# Patient Record
Sex: MALE | Race: WHITE | NOT HISPANIC OR LATINO | URBAN - METROPOLITAN AREA
[De-identification: names, ages, dates, MRNs, and addresses within clinical notes are randomized per-mention and may not be internally consistent; named-entity substitution may affect disease eponyms.]

---

## 2017-06-26 PROBLEM — Z00.00 ENCOUNTER FOR PREVENTIVE HEALTH EXAMINATION: Status: ACTIVE | Noted: 2017-06-26

## 2017-06-28 ENCOUNTER — EMERGENCY (EMERGENCY)
Facility: HOSPITAL | Age: 72
LOS: 0 days | Discharge: HOME | End: 2017-06-28
Attending: EMERGENCY MEDICINE | Admitting: EMERGENCY MEDICINE
Payer: COMMERCIAL

## 2017-06-28 VITALS
TEMPERATURE: 98 F | HEIGHT: 77 IN | RESPIRATION RATE: 18 BRPM | SYSTOLIC BLOOD PRESSURE: 127 MMHG | WEIGHT: 244.93 LBS | DIASTOLIC BLOOD PRESSURE: 63 MMHG | OXYGEN SATURATION: 100 % | HEART RATE: 68 BPM

## 2017-06-28 DIAGNOSIS — Z98.1 ARTHRODESIS STATUS: Chronic | ICD-10-CM

## 2017-06-28 PROCEDURE — 99283 EMERGENCY DEPT VISIT LOW MDM: CPT

## 2017-06-28 RX ORDER — LIDOCAINE 4 G/100G
1 CREAM TOPICAL
Qty: 14 | Refills: 0 | OUTPATIENT
Start: 2017-06-28

## 2017-06-28 RX ORDER — LIDOCAINE 4 G/100G
1 CREAM TOPICAL ONCE
Qty: 0 | Refills: 0 | Status: COMPLETED | OUTPATIENT
Start: 2017-06-28 | End: 2017-06-28

## 2017-06-28 RX ORDER — METHOCARBAMOL 500 MG/1
750 TABLET, FILM COATED ORAL ONCE
Qty: 0 | Refills: 0 | Status: COMPLETED | OUTPATIENT
Start: 2017-06-28 | End: 2017-06-28

## 2017-06-28 RX ORDER — KETOROLAC TROMETHAMINE 30 MG/ML
60 SYRINGE (ML) INJECTION ONCE
Qty: 0 | Refills: 0 | Status: DISCONTINUED | OUTPATIENT
Start: 2017-06-28 | End: 2017-06-28

## 2017-06-28 RX ORDER — METHOCARBAMOL 500 MG/1
1 TABLET, FILM COATED ORAL
Qty: 12 | Refills: 0 | OUTPATIENT
Start: 2017-06-28

## 2017-06-28 RX ORDER — METHOCARBAMOL 500 MG/1
1500 TABLET, FILM COATED ORAL ONCE
Qty: 0 | Refills: 0 | Status: DISCONTINUED | OUTPATIENT
Start: 2017-06-28 | End: 2017-06-28

## 2017-06-28 RX ADMIN — METHOCARBAMOL 750 MILLIGRAM(S): 500 TABLET, FILM COATED ORAL at 11:15

## 2017-06-28 RX ADMIN — Medication 60 MILLIGRAM(S): at 11:05

## 2017-06-28 RX ADMIN — LIDOCAINE 1 PATCH: 4 CREAM TOPICAL at 11:05

## 2017-06-28 NOTE — ED STATDOCS - OBJECTIVE STATEMENT
73 yo male h/o sciatica, herniated disc, and back surgeries x3, presents for worsening back pain x 3 weeks. Pt saw his PCP at [place] who prescribed him muscle relaxants for two weeks but he is still in pain and has been unable to walk much the past 3 weeks. Pain is left sided and goes down his left leg. Has been taking tylenol for pain relief.

## 2017-06-28 NOTE — ED ADULT TRIAGE NOTE - ESI TRIAGE ACUITY LEVEL, MLM
O/N: Hypotensive to 80s, mild tachycardia, asymptomatic. UOP adeqaute. High volume output from fistulae. Vac changed, dressings re-packed.  3/4: Dressing changed. vac functioning. Afebrile this shift. 3 O/N: Hypotensive to 80s, mild tachycardia, asymptomatic. UOP adeqaute. High volume output from fistulae. Vac changed, dressings re-packed.  3/4: Dressing changed. vac functioning. Afebrile this shift.       SUBJECTIVE:  Patient seen and examined at bedside with surgery chief resident on AM rounds. Pt w/ no complaints at this time. Tolerating diet. Denies n/v/pain. No other complaints.     MEDICATIONS  (STANDING):  losartan 100milliGRAM(s) Oral daily  escitalopram 20milliGRAM(s) Oral daily  ALBUTerol    90 MICROgram(s) HFA Inhaler 1Puff(s) Inhalation every 4 hours  cyanocobalamin 250MICROGram(s) Oral daily  cholecalciferol 400Unit(s) Oral daily  multivitamin 1Tablet(s) Oral daily  ferrous    sulfate 325milliGRAM(s) Oral daily  enoxaparin Injectable 60milliGRAM(s) SubCutaneous daily  pantoprazole    Tablet 40milliGRAM(s) Oral daily  collagenase Ointment 1Application(s) Topical daily  bisacodyl 5milliGRAM(s) Oral at bedtime  levoFLOXacin IVPB 750milliGRAM(s) IV Intermittent every 24 hours  levoFLOXacin IVPB  IV Intermittent     MEDICATIONS  (PRN):  acetaminophen   Tablet. 325milliGRAM(s) Oral every 4 hours PRN Moderate Pain (4 - 6)  ondansetron Injectable 4milliGRAM(s) IV Push every 6 hours PRN Nausea  ALBUTerol    0.083% 2.5milliGRAM(s) Nebulizer every 6 hours PRN Shortness of Breath and/or Wheezing  oxybutynin 5milliGRAM(s) Oral two times a day PRN Bladder spasms  diphenhydrAMINE   Capsule 25milliGRAM(s) Oral every 6 hours PRN Rash and/or Itching  diazepam    Tablet 10milliGRAM(s) Oral at bedtime PRN insomnia  oxyCODONE  5 mG/acetaminophen 325 mG 1Tablet(s) Oral every 4 hours PRN Severe Pain (7 - 10)      Vital Signs Last 24 Hrs  T(C): 37.1, Max: 37.2 (03-04 @ 20:42)  T(F): 98.8, Max: 99 (03-04 @ 20:42)  HR: 86 (76 - 95)  BP: 100/69 (83/49 - 109/69)  BP(mean): --  RR: 17 (15 - 18)  SpO2: 91% (91% - 97%)    PHYSICAL EXAM:      Constitutional: A&Ox3    Respiratory: non labored breathing, no respiratory distress    Cardiovascular: NSR, RRR    Gastrointestinal: Soft, ND, NT, Abdominal wounds w/ wound vac and dressings in place                 Incision: C/D/I, wound vac functioning appropriately    Genitourinary: voids     Extremities: (-) edema                  I&O's Detail    I & Os for current day (as of 05 Mar 2017 07:30)  =============================================  IN:    Oral Fluid: 600 ml    Total IN: 600 ml  ---------------------------------------------  OUT:    Voided: 1300 ml    Total OUT: 1300 ml  ---------------------------------------------  Total NET: -700 ml      LABS:                        10.1   6.5   )-----------( 273      ( 04 Mar 2017 07:21 )             33.4     04 Mar 2017 07:21    139    |  102    |  24     ----------------------------<  80     4.5     |  27     |  0.76     Ca    9.7        04 Mar 2017 07:21  Phos  3.4       04 Mar 2017 07:21  Mg     2.2       04 Mar 2017 07:21            RADIOLOGY & ADDITIONAL STUDIES:

## 2017-06-28 NOTE — ED STATDOCS - CARE PLAN
Principal Discharge DX:	Low back pain, unspecified back pain laterality, unspecified chronicity, with sciatica presence unspecified

## 2017-06-28 NOTE — ED STATDOCS - PROGRESS NOTE DETAILS
ELIO Caceres: Patient has been seen, evaluated and orders have been written by the attending in intake. Patient is stable.  I will follow up the results of orders written and I will continue to evaluate/observe the patient. 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 Will give rx for robaxin and lidoderm, pt states flexeril just makes him sleepy. Given spine f/u. Pt feeling well, pt and family agree with DC and plan of care.

## 2017-06-28 NOTE — ED STATDOCS - ATTENDING CONTRIBUTION TO CARE
I, Stephon Herrera, performed the initial face to face bedside interview with this patient regarding history of present illness, review of symptoms and relevant past medical, social and family history.  I completed an independent physical examination.  I was the initial provider who evaluated this patient. I have signed out the follow up of any pending tests (i.e. labs, radiological studies) to the ACP.  I have communicated the patient’s plan of care and disposition with the ACP.  The history, relevant review of systems, past medical and surgical history, medical decision making, and physical examination was documented by the scribe in my presence and I attest to the accuracy of the documentation.

## 2017-06-28 NOTE — ED ADULT NURSE NOTE - OBJECTIVE STATEMENT
Pt presents to ED from home, ambulatory,. pt alert and orientedx3, VSS afebrile, pt c/o back pain for twom  wekks that radiates down the left leg, pt diagnosed with dciatic and on muscle relaxers and pain meds at home. pt seen and medicated per order by MD pt discharged home in no acute distress

## 2017-06-29 DIAGNOSIS — M54.5 LOW BACK PAIN: ICD-10-CM

## 2017-06-29 DIAGNOSIS — Z98.1 ARTHRODESIS STATUS: ICD-10-CM

## 2018-01-10 ENCOUNTER — EMERGENCY (EMERGENCY)
Facility: HOSPITAL | Age: 73
LOS: 0 days | Discharge: HOME | End: 2018-01-10
Attending: EMERGENCY MEDICINE | Admitting: EMERGENCY MEDICINE
Payer: COMMERCIAL

## 2018-01-10 VITALS
HEART RATE: 72 BPM | RESPIRATION RATE: 18 BRPM | WEIGHT: 248.02 LBS | TEMPERATURE: 98 F | SYSTOLIC BLOOD PRESSURE: 156 MMHG | OXYGEN SATURATION: 100 % | HEIGHT: 78 IN | DIASTOLIC BLOOD PRESSURE: 71 MMHG

## 2018-01-10 VITALS — WEIGHT: 256.62 LBS

## 2018-01-10 DIAGNOSIS — Z98.1 ARTHRODESIS STATUS: Chronic | ICD-10-CM

## 2018-01-10 DIAGNOSIS — Z20.3 CONTACT WITH AND (SUSPECTED) EXPOSURE TO RABIES: ICD-10-CM

## 2018-01-10 PROCEDURE — 99283 EMERGENCY DEPT VISIT LOW MDM: CPT

## 2018-01-10 RX ORDER — RABIES VACC, HUMAN DIPLOID/PF 2.5 UNIT
1 VIAL (EA) INTRAMUSCULAR ONCE
Qty: 0 | Refills: 0 | Status: COMPLETED | OUTPATIENT
Start: 2018-01-10 | End: 2018-01-10

## 2018-01-10 RX ORDER — HUMAN RABIES VIRUS IMMUNE GLOBULIN 150 [IU]/ML
2300 INJECTION, SOLUTION INTRAMUSCULAR ONCE
Qty: 0 | Refills: 0 | Status: COMPLETED | OUTPATIENT
Start: 2018-01-10 | End: 2018-01-10

## 2018-01-10 RX ADMIN — Medication 1 MILLILITER(S): at 22:23

## 2018-01-10 RX ADMIN — HUMAN RABIES VIRUS IMMUNE GLOBULIN 2300 UNIT(S): 150 INJECTION, SOLUTION INTRAMUSCULAR at 22:24

## 2018-01-10 NOTE — ED STATDOCS - OBJECTIVE STATEMENT
71 yo male presents for rabies vaccinations s/p exposure to bat.  Bat was found in a bedroom last night, they caught it but it escaped.  Did not notice any bites.  Unknown how long the bat was in the house for or how it got in.  Has no symptoms or complaints.

## 2018-01-10 NOTE — ED STATDOCS - MEDICAL DECISION MAKING DETAILS
Will give rabies vaccination now.  Pt will return Saturday for second course before flying home to [place] and receiving the rest of the vaccines there.

## 2018-01-10 NOTE — ED STATDOCS - ATTENDING CONTRIBUTION TO CARE
Attending Contribution to Care: I, Ksenia Hernandez, performed the initial face to face bedside interview with this patient regarding history of present illness, review of symptoms and relevant past medical, social and family history.  I completed an independent physical examination.  I was the initial provider who evaluated this patient and the history, physical, and MDM reflect this intial assessment. I have signed out the follow up of any pending tests after the original (i.e. labs, radiological studies) to the ACP with instructions to review any with instructions to review any concerning findings to me prior to discharge.  I have communicated the patient’s plan of care and disposition with the ACP.

## 2018-01-13 ENCOUNTER — EMERGENCY (EMERGENCY)
Facility: HOSPITAL | Age: 73
LOS: 0 days | Discharge: HOME | End: 2018-01-13
Attending: EMERGENCY MEDICINE | Admitting: EMERGENCY MEDICINE
Payer: COMMERCIAL

## 2018-01-13 VITALS
SYSTOLIC BLOOD PRESSURE: 142 MMHG | TEMPERATURE: 98 F | HEART RATE: 87 BPM | DIASTOLIC BLOOD PRESSURE: 71 MMHG | WEIGHT: 250 LBS | OXYGEN SATURATION: 99 % | RESPIRATION RATE: 18 BRPM

## 2018-01-13 DIAGNOSIS — Z20.3 CONTACT WITH AND (SUSPECTED) EXPOSURE TO RABIES: ICD-10-CM

## 2018-01-13 DIAGNOSIS — Z98.1 ARTHRODESIS STATUS: Chronic | ICD-10-CM

## 2018-01-13 PROCEDURE — 99283 EMERGENCY DEPT VISIT LOW MDM: CPT

## 2018-01-13 RX ORDER — RABIES VACC, HUMAN DIPLOID/PF 2.5 UNIT
1 VIAL (EA) INTRAMUSCULAR ONCE
Qty: 0 | Refills: 0 | Status: COMPLETED | OUTPATIENT
Start: 2018-01-13 | End: 2018-01-13

## 2018-01-13 RX ADMIN — Medication 1 MILLILITER(S): at 12:13

## 2018-01-13 NOTE — ED ADULT TRIAGE NOTE - CHIEF COMPLAINT QUOTE
Pt presents to ED for possible exposure to rabies through bat. Pt presents for 2nd round of rabies shots.

## 2018-01-13 NOTE — ED STATDOCS - NEURO NEGATIVE STATEMENT, MLM
no loss of consciousness, no gait abnormality, no headache, no sensory deficits, and no weakness. Tingling at the tip of the tongue

## 2018-01-13 NOTE — ED STATDOCS - OBJECTIVE STATEMENT
73 y/o M Pt with no MHx presents to ED for day 3 of the rabies vaccination. Pt was exposed to a bat that was discovered in a room in the house. They were seen here earlier in the ED for the earlier vaccinations and are continuing their follow up here. No complaints at this time. 71 y/o M Pt with no MHx presents to ED for day 3 of the rabies vaccination. Pt was exposed to a bat that was discovered in a room in the house. They were seen here earlier in the ED for the earlier vaccinations and are continuing their follow up here. No complaints at this time with the exception of tingling at the tip of the tongue

## 2019-06-16 ENCOUNTER — TRANSCRIPTION ENCOUNTER (OUTPATIENT)
Age: 74
End: 2019-06-16

## 2020-11-06 NOTE — ED STATDOCS - RESPIRATORY NEGATIVE STATEMENT, MLM
Patient skin check completed by 2 RN's no wounds or skin breakdown noted.     Skin intact.    no chest pain, no cough, and no shortness of breath.

## 2022-06-16 ENCOUNTER — NON-APPOINTMENT (OUTPATIENT)
Age: 77
End: 2022-06-16

## 2022-12-18 ENCOUNTER — NON-APPOINTMENT (OUTPATIENT)
Age: 77
End: 2022-12-18

## 2023-02-27 NOTE — ED STATDOCS - CPE ED NEURO NORM
Patient walked into office, had a stroke 3 months ago, feeling dizzy, started last night.  Wanted to be checked without waiting hours in the ER
Pt walked into office with . Pt reports she has been feeling dizzy x 2-3 days. Worse since yesterday. Feels fatigued - has not been sleeping well. Generalized weakness. Had stroke at end of November. Has been doing therapy for right wrist/hand. BP in office 128/64. D/w Dr. Jesi Mason. She will see pt in office. Pt and  notified of plan. They verbalized understanding.
normal...

## 2023-03-14 NOTE — ED ADULT NURSE NOTE - CHIEF COMPLAINT QUOTE
Pt presents to ED for possible exposure to rabies through bat. Pt presents for 2nd round of rabies shots. Destruction After The Procedure: No

## 2023-12-21 ENCOUNTER — NON-APPOINTMENT (OUTPATIENT)
Age: 78
End: 2023-12-21

## 2023-12-30 ENCOUNTER — NON-APPOINTMENT (OUTPATIENT)
Age: 78
End: 2023-12-30

## 2024-05-20 PROBLEM — M54.30 SCIATICA, UNSPECIFIED SIDE: Chronic | Status: ACTIVE | Noted: 2017-06-28

## 2024-06-25 ENCOUNTER — APPOINTMENT (OUTPATIENT)
Dept: HEPATOLOGY | Facility: CLINIC | Age: 79
End: 2024-06-25
Payer: MEDICARE

## 2024-06-25 ENCOUNTER — RESULT REVIEW (OUTPATIENT)
Age: 79
End: 2024-06-25

## 2024-06-25 VITALS
TEMPERATURE: 97.8 F | SYSTOLIC BLOOD PRESSURE: 138 MMHG | HEIGHT: 68 IN | OXYGEN SATURATION: 97 % | BODY MASS INDEX: 32.89 KG/M2 | DIASTOLIC BLOOD PRESSURE: 79 MMHG | WEIGHT: 217 LBS | HEART RATE: 62 BPM

## 2024-06-25 DIAGNOSIS — N20.0 CALCULUS OF KIDNEY: ICD-10-CM

## 2024-06-25 DIAGNOSIS — E78.5 HYPERLIPIDEMIA, UNSPECIFIED: ICD-10-CM

## 2024-06-25 DIAGNOSIS — N40.0 BENIGN PROSTATIC HYPERPLASIA WITHOUT LOWER URINARY TRACT SYMPMS: ICD-10-CM

## 2024-06-25 DIAGNOSIS — E03.9 HYPOTHYROIDISM, UNSPECIFIED: ICD-10-CM

## 2024-06-25 DIAGNOSIS — I10 ESSENTIAL (PRIMARY) HYPERTENSION: ICD-10-CM

## 2024-06-25 PROCEDURE — 99204 OFFICE O/P NEW MOD 45 MIN: CPT

## 2024-06-25 RX ORDER — SIMVASTATIN 20 MG/1
20 TABLET, FILM COATED ORAL
Refills: 0 | Status: ACTIVE | COMMUNITY

## 2024-06-25 RX ORDER — TAMSULOSIN HYDROCHLORIDE 0.4 MG/1
0.4 CAPSULE ORAL
Refills: 0 | Status: ACTIVE | COMMUNITY

## 2024-06-25 RX ORDER — METOPROLOL SUCCINATE 50 MG/1
50 TABLET, EXTENDED RELEASE ORAL
Refills: 0 | Status: ACTIVE | COMMUNITY

## 2024-06-25 RX ORDER — CANDESARTAN CILEXETIL 8 MG/1
8 TABLET ORAL
Refills: 0 | Status: ACTIVE | COMMUNITY

## 2024-06-26 ENCOUNTER — APPOINTMENT (OUTPATIENT)
Dept: ULTRASOUND IMAGING | Facility: CLINIC | Age: 79
End: 2024-06-26
Payer: MEDICARE

## 2024-06-26 ENCOUNTER — OUTPATIENT (OUTPATIENT)
Dept: OUTPATIENT SERVICES | Facility: HOSPITAL | Age: 79
LOS: 1 days | End: 2024-06-26
Payer: MEDICARE

## 2024-06-26 DIAGNOSIS — K74.60 UNSPECIFIED CIRRHOSIS OF LIVER: ICD-10-CM

## 2024-06-26 PROCEDURE — 76705 ECHO EXAM OF ABDOMEN: CPT | Mod: 26

## 2024-06-26 PROCEDURE — 76705 ECHO EXAM OF ABDOMEN: CPT

## 2024-06-27 LAB
AFP-TM SERPL-MCNC: 2.5 NG/ML
ALBUMIN SERPL ELPH-MCNC: 4.2 G/DL
ALP BLD-CCNC: 147 U/L
ALT SERPL-CCNC: 49 U/L
ANA PAT FLD IF-IMP: NORMAL
ANA SER IF-ACNC: ABNORMAL
ANION GAP SERPL CALC-SCNC: 14 MMOL/L
AST SERPL-CCNC: 36 U/L
BASOPHILS # BLD AUTO: 0.03 K/UL
BASOPHILS NFR BLD AUTO: 0.5 %
BILIRUB SERPL-MCNC: 0.6 MG/DL
BUN SERPL-MCNC: 33 MG/DL
CALCIUM SERPL-MCNC: 9.6 MG/DL
CHLORIDE SERPL-SCNC: 105 MMOL/L
CO2 SERPL-SCNC: 22 MMOL/L
CREAT SERPL-MCNC: 1.44 MG/DL
DEPRECATED KAPPA LC FREE/LAMBDA SER: 1.45 RATIO
EGFR: 49 ML/MIN/1.73M2
EOSINOPHIL # BLD AUTO: 0.06 K/UL
EOSINOPHIL NFR BLD AUTO: 1 %
GLUCOSE SERPL-MCNC: 93 MG/DL
HCT VFR BLD CALC: 40.6 %
HGB BLD-MCNC: 13.5 G/DL
IGA SER QL IEP: 459 MG/DL
IGG SER QL IEP: 1182 MG/DL
IGM SER QL IEP: 40 MG/DL
IMM GRANULOCYTES NFR BLD AUTO: 0.5 %
INR PPP: 1 RATIO
KAPPA LC CSF-MCNC: 3.06 MG/DL
KAPPA LC SERPL-MCNC: 4.44 MG/DL
LYMPHOCYTES # BLD AUTO: 1.79 K/UL
LYMPHOCYTES NFR BLD AUTO: 30.5 %
MAN DIFF?: NORMAL
MCHC RBC-ENTMCNC: 33.3 GM/DL
MCHC RBC-ENTMCNC: 33.9 PG
MCV RBC AUTO: 102 FL
MONOCYTES # BLD AUTO: 0.48 K/UL
MONOCYTES NFR BLD AUTO: 8.2 %
NEUTROPHILS # BLD AUTO: 3.47 K/UL
NEUTROPHILS NFR BLD AUTO: 59.3 %
PLATELET # BLD AUTO: 102 K/UL
POTASSIUM SERPL-SCNC: 4.8 MMOL/L
PROT SERPL-MCNC: 7 G/DL
PT BLD: 11.4 SEC
RBC # BLD: 3.98 M/UL
RBC # FLD: 13.7 %
SMOOTH MUSCLE AB SER QL IF: NORMAL
SODIUM SERPL-SCNC: 141 MMOL/L
WBC # FLD AUTO: 5.86 K/UL

## 2024-07-01 ENCOUNTER — APPOINTMENT (OUTPATIENT)
Dept: HEPATOLOGY | Facility: CLINIC | Age: 79
End: 2024-07-01
Payer: MEDICARE

## 2024-07-01 ENCOUNTER — APPOINTMENT (OUTPATIENT)
Dept: ULTRASOUND IMAGING | Facility: CLINIC | Age: 79
End: 2024-07-01

## 2024-07-01 PROCEDURE — 76981 USE PARENCHYMA: CPT

## 2024-07-02 DIAGNOSIS — K74.60 UNSPECIFIED CIRRHOSIS OF LIVER: ICD-10-CM

## 2024-12-24 ENCOUNTER — NON-APPOINTMENT (OUTPATIENT)
Age: 79
End: 2024-12-24

## 2024-12-30 ENCOUNTER — APPOINTMENT (OUTPATIENT)
Dept: HEPATOLOGY | Facility: CLINIC | Age: 79
End: 2024-12-30

## 2025-01-11 ENCOUNTER — NON-APPOINTMENT (OUTPATIENT)
Age: 80
End: 2025-01-11

## 2025-01-11 ENCOUNTER — EMERGENCY (EMERGENCY)
Facility: HOSPITAL | Age: 80
LOS: 1 days | Discharge: HOME | End: 2025-01-11
Attending: EMERGENCY MEDICINE
Payer: MEDICARE

## 2025-01-11 VITALS
RESPIRATION RATE: 18 BRPM | OXYGEN SATURATION: 95 % | TEMPERATURE: 98 F | HEART RATE: 82 BPM | WEIGHT: 212.08 LBS | DIASTOLIC BLOOD PRESSURE: 70 MMHG | HEIGHT: 72 IN | SYSTOLIC BLOOD PRESSURE: 127 MMHG

## 2025-01-11 DIAGNOSIS — Z98.1 ARTHRODESIS STATUS: Chronic | ICD-10-CM

## 2025-01-11 LAB
ALBUMIN SERPL ELPH-MCNC: 3.5 G/DL — SIGNIFICANT CHANGE UP (ref 3.3–5.2)
ALP SERPL-CCNC: 136 U/L — HIGH (ref 40–120)
ALT FLD-CCNC: 32 U/L — SIGNIFICANT CHANGE UP
ANION GAP SERPL CALC-SCNC: 11 MMOL/L — SIGNIFICANT CHANGE UP (ref 5–17)
AST SERPL-CCNC: 28 U/L — SIGNIFICANT CHANGE UP
BASOPHILS # BLD AUTO: 0.03 K/UL — SIGNIFICANT CHANGE UP (ref 0–0.2)
BASOPHILS NFR BLD AUTO: 0.5 % — SIGNIFICANT CHANGE UP (ref 0–2)
BILIRUB SERPL-MCNC: 0.6 MG/DL — SIGNIFICANT CHANGE UP (ref 0.4–2)
BUN SERPL-MCNC: 25 MG/DL — HIGH (ref 8–20)
CALCIUM SERPL-MCNC: 9.2 MG/DL — SIGNIFICANT CHANGE UP (ref 8.4–10.5)
CHLORIDE SERPL-SCNC: 101 MMOL/L — SIGNIFICANT CHANGE UP (ref 96–108)
CO2 SERPL-SCNC: 24 MMOL/L — SIGNIFICANT CHANGE UP (ref 22–29)
CREAT SERPL-MCNC: 1.45 MG/DL — HIGH (ref 0.5–1.3)
EGFR: 48 ML/MIN/1.73M2 — LOW
EGFR: 48 ML/MIN/1.73M2 — LOW
EOSINOPHIL # BLD AUTO: 0.13 K/UL — SIGNIFICANT CHANGE UP (ref 0–0.5)
EOSINOPHIL NFR BLD AUTO: 2.3 % — SIGNIFICANT CHANGE UP (ref 0–6)
FLUAV AG NPH QL: SIGNIFICANT CHANGE UP
FLUBV AG NPH QL: SIGNIFICANT CHANGE UP
GLUCOSE SERPL-MCNC: 108 MG/DL — HIGH (ref 70–99)
HCT VFR BLD CALC: 36.8 % — LOW (ref 39–50)
HGB BLD-MCNC: 12.5 G/DL — LOW (ref 13–17)
IMM GRANULOCYTES NFR BLD AUTO: 0.4 % — SIGNIFICANT CHANGE UP (ref 0–0.9)
LYMPHOCYTES # BLD AUTO: 1.67 K/UL — SIGNIFICANT CHANGE UP (ref 1–3.3)
LYMPHOCYTES # BLD AUTO: 29.3 % — SIGNIFICANT CHANGE UP (ref 13–44)
MCHC RBC-ENTMCNC: 33.7 PG — SIGNIFICANT CHANGE UP (ref 27–34)
MCHC RBC-ENTMCNC: 34 G/DL — SIGNIFICANT CHANGE UP (ref 32–36)
MCV RBC AUTO: 99.2 FL — SIGNIFICANT CHANGE UP (ref 80–100)
MONOCYTES # BLD AUTO: 0.77 K/UL — SIGNIFICANT CHANGE UP (ref 0–0.9)
MONOCYTES NFR BLD AUTO: 13.5 % — SIGNIFICANT CHANGE UP (ref 2–14)
NEUTROPHILS # BLD AUTO: 3.07 K/UL — SIGNIFICANT CHANGE UP (ref 1.8–7.4)
NEUTROPHILS NFR BLD AUTO: 54 % — SIGNIFICANT CHANGE UP (ref 43–77)
NT-PROBNP SERPL-SCNC: 59 PG/ML — SIGNIFICANT CHANGE UP (ref 0–300)
PLATELET # BLD AUTO: 81 K/UL — LOW (ref 150–400)
POTASSIUM SERPL-MCNC: 5 MMOL/L — SIGNIFICANT CHANGE UP (ref 3.5–5.3)
POTASSIUM SERPL-SCNC: 5 MMOL/L — SIGNIFICANT CHANGE UP (ref 3.5–5.3)
PROT SERPL-MCNC: 6.7 G/DL — SIGNIFICANT CHANGE UP (ref 6.6–8.7)
RBC # BLD: 3.71 M/UL — LOW (ref 4.2–5.8)
RBC # FLD: 13.4 % — SIGNIFICANT CHANGE UP (ref 10.3–14.5)
RSV RNA NPH QL NAA+NON-PROBE: SIGNIFICANT CHANGE UP
SARS-COV-2 RNA SPEC QL NAA+PROBE: SIGNIFICANT CHANGE UP
SODIUM SERPL-SCNC: 136 MMOL/L — SIGNIFICANT CHANGE UP (ref 135–145)
TROPONIN T, HIGH SENSITIVITY RESULT: 13 NG/L — SIGNIFICANT CHANGE UP (ref 0–51)
WBC # BLD: 5.69 K/UL — SIGNIFICANT CHANGE UP (ref 3.8–10.5)
WBC # FLD AUTO: 5.69 K/UL — SIGNIFICANT CHANGE UP (ref 3.8–10.5)

## 2025-01-11 PROCEDURE — 71046 X-RAY EXAM CHEST 2 VIEWS: CPT

## 2025-01-11 PROCEDURE — 71046 X-RAY EXAM CHEST 2 VIEWS: CPT | Mod: 26

## 2025-01-11 PROCEDURE — 80053 COMPREHEN METABOLIC PANEL: CPT

## 2025-01-11 PROCEDURE — 99285 EMERGENCY DEPT VISIT HI MDM: CPT | Mod: 25

## 2025-01-11 PROCEDURE — 94640 AIRWAY INHALATION TREATMENT: CPT

## 2025-01-11 PROCEDURE — 93010 ELECTROCARDIOGRAM REPORT: CPT

## 2025-01-11 PROCEDURE — 99284 EMERGENCY DEPT VISIT MOD MDM: CPT

## 2025-01-11 PROCEDURE — 93005 ELECTROCARDIOGRAM TRACING: CPT

## 2025-01-11 PROCEDURE — 84484 ASSAY OF TROPONIN QUANT: CPT

## 2025-01-11 PROCEDURE — 87637 SARSCOV2&INF A&B&RSV AMP PRB: CPT

## 2025-01-11 PROCEDURE — 85025 COMPLETE CBC W/AUTO DIFF WBC: CPT

## 2025-01-11 PROCEDURE — 83880 ASSAY OF NATRIURETIC PEPTIDE: CPT

## 2025-01-11 PROCEDURE — 36415 COLL VENOUS BLD VENIPUNCTURE: CPT

## 2025-01-11 RX ORDER — ALBUTEROL SULFATE 2.5 MG/3ML
2 VIAL, NEBULIZER (ML) INHALATION
Qty: 1 | Refills: 0
Start: 2025-01-11

## 2025-01-11 RX ORDER — DOXYCYCLINE HYCLATE 100 MG
1 TABLET ORAL
Qty: 14 | Refills: 0
Start: 2025-01-11 | End: 2025-01-17

## 2025-01-11 RX ORDER — ALBUTEROL SULFATE 2.5 MG/3ML
2 VIAL, NEBULIZER (ML) INHALATION ONCE
Refills: 0 | Status: COMPLETED | OUTPATIENT
Start: 2025-01-11 | End: 2025-01-11

## 2025-01-11 RX ORDER — BENZONATATE 100 MG
100 CAPSULE ORAL ONCE
Refills: 0 | Status: COMPLETED | OUTPATIENT
Start: 2025-01-11 | End: 2025-01-11

## 2025-01-11 RX ORDER — BENZONATATE 100 MG
1 CAPSULE ORAL
Qty: 15 | Refills: 0
Start: 2025-01-11 | End: 2025-01-15

## 2025-01-11 RX ORDER — DOXYCYCLINE HYCLATE 100 MG
100 TABLET ORAL ONCE
Refills: 0 | Status: COMPLETED | OUTPATIENT
Start: 2025-01-11 | End: 2025-01-11

## 2025-01-11 RX ADMIN — Medication 100 MILLIGRAM(S): at 17:24

## 2025-01-11 RX ADMIN — Medication 100 MILLIGRAM(S): at 19:07

## 2025-01-11 RX ADMIN — Medication 2 PUFF(S): at 17:24

## 2025-01-16 ENCOUNTER — APPOINTMENT (OUTPATIENT)
Dept: CARDIOLOGY | Facility: CLINIC | Age: 80
End: 2025-01-16
Payer: MEDICARE

## 2025-01-16 VITALS
DIASTOLIC BLOOD PRESSURE: 70 MMHG | SYSTOLIC BLOOD PRESSURE: 110 MMHG | BODY MASS INDEX: 29.39 KG/M2 | OXYGEN SATURATION: 97 % | WEIGHT: 217 LBS | HEART RATE: 60 BPM | RESPIRATION RATE: 16 BRPM | HEIGHT: 72 IN

## 2025-01-16 DIAGNOSIS — Z78.9 OTHER SPECIFIED HEALTH STATUS: ICD-10-CM

## 2025-01-16 DIAGNOSIS — R94.31 ABNORMAL ELECTROCARDIOGRAM [ECG] [EKG]: ICD-10-CM

## 2025-01-16 DIAGNOSIS — K76.0 FATTY (CHANGE OF) LIVER, NOT ELSEWHERE CLASSIFIED: ICD-10-CM

## 2025-01-16 DIAGNOSIS — E66.01 MORBID (SEVERE) OBESITY DUE TO EXCESS CALORIES: ICD-10-CM

## 2025-01-16 DIAGNOSIS — K74.60 UNSPECIFIED CIRRHOSIS OF LIVER: ICD-10-CM

## 2025-01-16 DIAGNOSIS — I77.810 THORACIC AORTIC ECTASIA: ICD-10-CM

## 2025-01-16 DIAGNOSIS — G62.9 POLYNEUROPATHY, UNSPECIFIED: ICD-10-CM

## 2025-01-16 DIAGNOSIS — R60.0 LOCALIZED EDEMA: ICD-10-CM

## 2025-01-16 DIAGNOSIS — R06.09 OTHER FORMS OF DYSPNEA: ICD-10-CM

## 2025-01-16 DIAGNOSIS — D64.9 ANEMIA, UNSPECIFIED: ICD-10-CM

## 2025-01-16 DIAGNOSIS — D69.6 THROMBOCYTOPENIA, UNSPECIFIED: ICD-10-CM

## 2025-01-16 DIAGNOSIS — I70.0 ATHEROSCLEROSIS OF AORTA: ICD-10-CM

## 2025-01-16 DIAGNOSIS — Z82.3 FAMILY HISTORY OF STROKE: ICD-10-CM

## 2025-01-16 DIAGNOSIS — R01.1 CARDIAC MURMUR, UNSPECIFIED: ICD-10-CM

## 2025-01-16 DIAGNOSIS — N20.0 CALCULUS OF KIDNEY: ICD-10-CM

## 2025-01-16 DIAGNOSIS — Z82.49 FAMILY HISTORY OF ISCHEMIC HEART DISEASE AND OTHER DISEASES OF THE CIRCULATORY SYSTEM: ICD-10-CM

## 2025-01-16 DIAGNOSIS — I25.10 ATHEROSCLEROTIC HEART DISEASE OF NATIVE CORONARY ARTERY W/OUT ANGINA PECTORIS: ICD-10-CM

## 2025-01-16 DIAGNOSIS — N18.31 CHRONIC KIDNEY DISEASE, STAGE 3A: ICD-10-CM

## 2025-01-16 PROCEDURE — 93000 ELECTROCARDIOGRAM COMPLETE: CPT

## 2025-01-16 PROCEDURE — 99204 OFFICE O/P NEW MOD 45 MIN: CPT

## 2025-01-16 RX ORDER — LEVOTHYROXINE SODIUM 0.11 MG/1
112 TABLET ORAL
Refills: 0 | Status: ACTIVE | COMMUNITY

## 2025-01-16 RX ORDER — MULTIVIT WITH MIN/ALA/HERBS 50 MG
TABLET ORAL
Refills: 0 | Status: ACTIVE | COMMUNITY

## 2025-01-17 ENCOUNTER — APPOINTMENT (OUTPATIENT)
Dept: CARDIOLOGY | Facility: CLINIC | Age: 80
End: 2025-01-17

## 2025-01-23 ENCOUNTER — APPOINTMENT (OUTPATIENT)
Dept: CARDIOLOGY | Facility: CLINIC | Age: 80
End: 2025-01-23
Payer: MEDICARE

## 2025-01-23 PROCEDURE — 93015 CV STRESS TEST SUPVJ I&R: CPT

## 2025-01-23 PROCEDURE — A9500: CPT

## 2025-01-23 PROCEDURE — 78452 HT MUSCLE IMAGE SPECT MULT: CPT

## 2025-01-29 ENCOUNTER — APPOINTMENT (OUTPATIENT)
Dept: CARDIOLOGY | Facility: CLINIC | Age: 80
End: 2025-01-29
Payer: MEDICARE

## 2025-01-29 ENCOUNTER — APPOINTMENT (OUTPATIENT)
Dept: CARDIOLOGY | Facility: CLINIC | Age: 80
End: 2025-01-29

## 2025-01-29 PROCEDURE — 93306 TTE W/DOPPLER COMPLETE: CPT

## 2025-01-29 RX ORDER — PERFLUTREN 6.52 MG/ML
6.52 INJECTION, SUSPENSION INTRAVENOUS
Qty: 2 | Refills: 0 | Status: COMPLETED | OUTPATIENT
Start: 2025-01-29

## 2025-02-05 ENCOUNTER — APPOINTMENT (OUTPATIENT)
Dept: CARDIOLOGY | Facility: CLINIC | Age: 80
End: 2025-02-05

## 2025-02-05 VITALS
BODY MASS INDEX: 29.66 KG/M2 | HEART RATE: 70 BPM | SYSTOLIC BLOOD PRESSURE: 100 MMHG | DIASTOLIC BLOOD PRESSURE: 62 MMHG | OXYGEN SATURATION: 96 % | WEIGHT: 219 LBS | RESPIRATION RATE: 16 BRPM | HEIGHT: 72 IN

## 2025-02-05 DIAGNOSIS — R01.1 CARDIAC MURMUR, UNSPECIFIED: ICD-10-CM

## 2025-02-05 DIAGNOSIS — I10 ESSENTIAL (PRIMARY) HYPERTENSION: ICD-10-CM

## 2025-02-05 DIAGNOSIS — K76.0 FATTY (CHANGE OF) LIVER, NOT ELSEWHERE CLASSIFIED: ICD-10-CM

## 2025-02-05 DIAGNOSIS — R06.09 OTHER FORMS OF DYSPNEA: ICD-10-CM

## 2025-02-05 PROCEDURE — 99214 OFFICE O/P EST MOD 30 MIN: CPT

## 2025-02-05 PROCEDURE — 93000 ELECTROCARDIOGRAM COMPLETE: CPT
